# Patient Record
Sex: MALE | Race: WHITE | Employment: FULL TIME | URBAN - METROPOLITAN AREA
[De-identification: names, ages, dates, MRNs, and addresses within clinical notes are randomized per-mention and may not be internally consistent; named-entity substitution may affect disease eponyms.]

---

## 2020-01-16 ENCOUNTER — APPOINTMENT (OUTPATIENT)
Dept: GENERAL RADIOLOGY | Facility: CLINIC | Age: 55
End: 2020-01-16
Attending: EMERGENCY MEDICINE
Payer: COMMERCIAL

## 2020-01-16 ENCOUNTER — HOSPITAL ENCOUNTER (EMERGENCY)
Facility: CLINIC | Age: 55
Discharge: HOME | End: 2020-01-16
Attending: EMERGENCY MEDICINE | Admitting: EMERGENCY MEDICINE
Payer: COMMERCIAL

## 2020-01-16 VITALS
RESPIRATION RATE: 18 BRPM | SYSTOLIC BLOOD PRESSURE: 135 MMHG | OXYGEN SATURATION: 97 % | HEART RATE: 101 BPM | DIASTOLIC BLOOD PRESSURE: 78 MMHG | TEMPERATURE: 98 F | WEIGHT: 225 LBS

## 2020-01-16 DIAGNOSIS — S52.502A CLOSED FRACTURE OF DISTAL END OF LEFT RADIUS, UNSPECIFIED FRACTURE MORPHOLOGY, INITIAL ENCOUNTER: ICD-10-CM

## 2020-01-16 PROCEDURE — 73100 X-RAY EXAM OF WRIST: CPT | Mod: LT

## 2020-01-16 PROCEDURE — 25000128 H RX IP 250 OP 636: Performed by: EMERGENCY MEDICINE

## 2020-01-16 PROCEDURE — 25605 CLTX DST RDL FX/EPHYS SEP W/: CPT | Mod: LT

## 2020-01-16 PROCEDURE — 25000132 ZZH RX MED GY IP 250 OP 250 PS 637: Performed by: EMERGENCY MEDICINE

## 2020-01-16 PROCEDURE — 99285 EMERGENCY DEPT VISIT HI MDM: CPT | Mod: 25

## 2020-01-16 PROCEDURE — 40000986 XR WRIST LEFT G/E 3 VIEWS: Mod: LT

## 2020-01-16 RX ORDER — HYDROCODONE BITARTRATE AND ACETAMINOPHEN 5; 325 MG/1; MG/1
1 TABLET ORAL ONCE
Status: COMPLETED | OUTPATIENT
Start: 2020-01-16 | End: 2020-01-16

## 2020-01-16 RX ORDER — IBUPROFEN 600 MG/1
600 TABLET, FILM COATED ORAL EVERY 6 HOURS PRN
Qty: 20 TABLET | Refills: 0 | Status: SHIPPED | OUTPATIENT
Start: 2020-01-16

## 2020-01-16 RX ORDER — BUPIVACAINE HYDROCHLORIDE 5 MG/ML
10 INJECTION, SOLUTION PERINEURAL ONCE
Status: COMPLETED | OUTPATIENT
Start: 2020-01-16 | End: 2020-01-16

## 2020-01-16 RX ORDER — HYDROCODONE BITARTRATE AND ACETAMINOPHEN 5; 325 MG/1; MG/1
1-2 TABLET ORAL EVERY 4 HOURS PRN
Qty: 15 TABLET | Refills: 0 | Status: SHIPPED | OUTPATIENT
Start: 2020-01-16

## 2020-01-16 RX ADMIN — BUPIVACAINE HYDROCHLORIDE 50 MG: 5 INJECTION, SOLUTION PERINEURAL at 19:48

## 2020-01-16 RX ADMIN — HYDROCODONE BITARTRATE AND ACETAMINOPHEN 1 TABLET: 5; 325 TABLET ORAL at 19:47

## 2020-01-16 ASSESSMENT — ENCOUNTER SYMPTOMS
NUMBNESS: 0
ARTHRALGIAS: 1

## 2020-01-16 NOTE — ED AVS SNAPSHOT
[facility] Emergency Department  [address]  [facility] [address]  Phone:  [phone]  Fax:  [phone]                                    Pedro Colorado   MRN: [id number]    Department:  [facility] Emergency Department   Date of Visit:  1/16/2020           After Visit Summary Signature Page    I have received my discharge instructions, and my questions have been answered. I have discussed any challenges I see with this plan with the nurse or doctor.    ..........................................................................................................................................  Patient/Patient Representative Signature      ..........................................................................................................................................  Patient Representative Print Name and Relationship to Patient    ..................................................               ................................................  Date                                   Time    ..........................................................................................................................................  Reviewed by Signature/Title    ...................................................              ..............................................  Date                                               Time          22EPIC Rev 08/18

## 2020-01-17 NOTE — ED PROVIDER NOTES
History     Chief Complaint:  Wrist Pain    The history is provided by the patient.      Pedro Colorado is a 54 year old right hand dominant male who presents to the emergency department today for evaluation of left wrist pain. The patient states he was attempting to move his new elliptical into his home over a small stair when he pulled on one of the plastic zip ties it broke and he fell backwards. He outstretched his left hand and injured his wrist in the fall. He denies having any pain further up in the arm and has had no numbness in the extremity. No reported head trauma.     Allergies:  No Known Drug Allergies     Medications:    Albuterol inhaler  Metoprolol tartrate     Past Medical History:    Hypertension  Asthma   Hemorrhoid     Past Surgical History:    [place] teeth extraction     Family History:    Cancer- breast   Heart disease- Mother  Down Syndrome     Social History:  The patient was unaccompanied to the ED.  Smoking Status: Never Smoker  Smokeless Tobacco: Never Used  Alcohol Use: Positive, rarely      Review of Systems   Musculoskeletal: Positive for arthralgias (left wrist).   Neurological: Negative for numbness.   All other systems reviewed and are negative.      Physical Exam     Patient Vitals for the past 24 hrs:   BP Temp Temp src Pulse Heart Rate Resp SpO2 Weight   01/16/20 1947 -- -- -- -- 90 -- -- --   01/16/20 1914 135/78 98  F (36.7  C) Temporal 101 101 18 97 % 102.1 kg (225 lb)      Physical Exam   General: Resting comfortably   Head:  The scalp, face, and head appear normal  Eyes:  The pupils are normal    Conjunctivae and sclera appear normal  ENT:    The nose is normal  Neck:  Normal range of motion  Skin:  No rash or lesions noted.  Neuro:  Speech is normal and fluent  Psych: Awake. Alert.  Normal affect  MSK:  L. Wrist   Noted tenderness to palpation, slight dorsal deformity noted. Decreased flexion/extension of wrist 2/2 to pain. No reproducible L. Elbow/L. Shoulder  tenderness    The finger flexors (FDS/FDP) are intact    The finger extensors are intact    The thumb exam is normal, including:    Adduction, abduction, flexion, extension, opposition    There are no sensory deficits    Median, Ulnar, and Radial nerve function is normal    Radial artery pulsations are normal    Capillary refill is normal      Emergency Department Course     Imaging:  Radiology findings were communicated with the patient who voiced understanding of the findings.  XR, Left Wrist, 2 Views (1931)  Mildly comminuted distal radius fracture. There is displacement and dorsal tilting.  Reading per radiology      XR, Left Wrist, 2 Views (2034)  A splint has been placed. Intra-articular fracture of the distal left radius again demonstrated with mild comminution, dorsal angulation, and impaction not significantly changed.  Reading per radiology        Procedures:    Procedure Note: Hematoma Block  Indication: Distal Radius Fracture  Procedure: Skin Cleansed in the standard fashion.  Skin anesthetized with 6mL of Bupivacaine 0.5% without epinephrine.  6mL of Bupivacaine 0.5% without epinephrine was then inserted into the hematoma after placement of needle confirmed by aspiration of hematoma.  There were no complications and patient tolerated the procedure well.     Sarah Catalan DO      Procedure Note: Distal Forearm Fracture Reduction  Indication: Fracture   Procedure: After the patient's pain was adequately controlled, the injury was recreated followed by longitudinal traction, and reduction of the distal fracture fragment. An appropriate splint was then applied. There were no immediate complications, and distal sensation, perfusion, and circulation were intact after the procedure.     Sarah Catalan DO      Procedure Note - Splint Application  Procedure: A Sugar Tong Splint was applied and after placement I checked and adjusted the fit to ensure proper positioning. Patient was more comfortable  with splint in place. Sensation and circulation are intact after splint placement.    Sarah Catalan, DO      Interventions:  1947 Hydrocodone-Acetaminophen 1x 5-325 mg PO  1948 Bupivacaine 50 mg Intradermal     Emergency Department Course:  1917 Nursing notes and vitals reviewed.  1921 I performed an exam of the patient as documented above.   1931 The patient was sent for an x-ray while in the emergency department, results above.     2019 I preformed a hematoma block and fracture reduction on the patient as detailed above.   2034 The patient was sent for a repeat x-ray while in the emergency department, results above.      Findings and plan explained to the Patient. Patient discharged home with instructions regarding supportive care, medications, and reasons to return. The importance of close follow-up was reviewed. The patient was prescribed Hydrocodone-Acetaminophen and Ibuprofen    I personally reviewed the imaging results with the Patient and answered all related questions prior to discharge.      Impression & Plan      Medical Decision Making:  Patient is a 54-year-old male presenting with left wrist pain.  He is neurovascularly intact on arrival.  X-ray confirms left distal radius fracture with noted slight dorsal angulation.  I discussed reduction techniques with the patient.  He was requesting hematoma block over procedural sedation.  This was performed without complication and attempted better realignment though without significant improvements on post reduction films.  The patient was placed in a sugar tong splint as noted herein.  He remained neurovascularly intact postprocedure.  I did discuss recommendation for close orthopedic outpatient follow-up particularly given concern for intra-articular involvement.  I recommended ibuprofen/Norco for breakthrough pain.  Return to the ED for worsening pain, paresthesias or should symptoms worsen or change.  The remainder of his head to toe exam is without  trauma.        Diagnosis:    ICD-10-CM    1. Closed fracture of distal end of left radius, unspecified fracture morphology, initial encounter S52.502A        Disposition:  The patient is discharged to home.     Discharge Medications:  New Prescriptions    HYDROCODONE-ACETAMINOPHEN (NORCO) 5-325 MG TABLET    Take 1-2 tablets by mouth every 4 hours as needed for pain    IBUPROFEN (ADVIL/MOTRIN) 600 MG TABLET    Take 1 tablet (600 mg) by mouth every 6 hours as needed       Scribe Disclosure:  I, Pili Meadows, am serving as a scribe at 7:18 PM on 1/16/2020 to document services personally performed by Sarah Catalan DO based on my observations and the provider's statements to me.    1/16/2020   [facility] EMERGENCY DEPARTMENT     Sarah Catalan DO  01/16/20 1158

## 2020-11-22 ENCOUNTER — HEALTH MAINTENANCE LETTER (OUTPATIENT)
Age: 55
End: 2020-11-22

## 2021-04-01 ENCOUNTER — IMMUNIZATION (OUTPATIENT)
Dept: NURSING | Facility: CLINIC | Age: 56
End: 2021-04-01
Payer: COMMERCIAL

## 2021-04-01 PROCEDURE — 0001A PR COVID VAC PFIZER DIL RECON 30 MCG/0.3 ML IM: CPT

## 2021-04-01 PROCEDURE — 91300 PR COVID VAC PFIZER DIL RECON 30 MCG/0.3 ML IM: CPT

## 2021-04-22 ENCOUNTER — IMMUNIZATION (OUTPATIENT)
Dept: NURSING | Facility: CLINIC | Age: 56
End: 2021-04-22
Attending: INTERNAL MEDICINE
Payer: COMMERCIAL

## 2021-04-22 PROCEDURE — 91300 PR COVID VAC PFIZER DIL RECON 30 MCG/0.3 ML IM: CPT

## 2021-04-22 PROCEDURE — 0002A PR COVID VAC PFIZER DIL RECON 30 MCG/0.3 ML IM: CPT

## 2021-09-19 ENCOUNTER — HEALTH MAINTENANCE LETTER (OUTPATIENT)
Age: 56
End: 2021-09-19

## 2022-01-09 ENCOUNTER — HEALTH MAINTENANCE LETTER (OUTPATIENT)
Age: 57
End: 2022-01-09

## 2022-11-21 ENCOUNTER — HEALTH MAINTENANCE LETTER (OUTPATIENT)
Age: 57
End: 2022-11-21

## 2023-04-16 ENCOUNTER — HEALTH MAINTENANCE LETTER (OUTPATIENT)
Age: 58
End: 2023-04-16

## 2025-07-05 ENCOUNTER — HOSPITAL ENCOUNTER (EMERGENCY)
Facility: CLINIC | Age: 60
Discharge: HOME | End: 2025-07-05
Attending: PHYSICIAN ASSISTANT | Admitting: PHYSICIAN ASSISTANT
Payer: COMMERCIAL

## 2025-07-05 VITALS
OXYGEN SATURATION: 96 % | WEIGHT: 233.25 LBS | HEART RATE: 81 BPM | RESPIRATION RATE: 16 BRPM | BODY MASS INDEX: 32.65 KG/M2 | TEMPERATURE: 97.7 F | DIASTOLIC BLOOD PRESSURE: 80 MMHG | SYSTOLIC BLOOD PRESSURE: 137 MMHG | HEIGHT: 71 IN

## 2025-07-05 DIAGNOSIS — S05.01XA ABRASION OF RIGHT CORNEA, INITIAL ENCOUNTER: ICD-10-CM

## 2025-07-05 PROCEDURE — 99283 EMERGENCY DEPT VISIT LOW MDM: CPT

## 2025-07-05 RX ORDER — TETRACAINE HYDROCHLORIDE 5 MG/ML
2 SOLUTION OPHTHALMIC ONCE
Status: DISCONTINUED | OUTPATIENT
Start: 2025-07-05 | End: 2025-07-05 | Stop reason: HOSPADM

## 2025-07-05 RX ORDER — CIPROFLOXACIN HYDROCHLORIDE 3.5 MG/ML
2 SOLUTION/ DROPS TOPICAL 4 TIMES DAILY
Qty: 10 ML | Refills: 0 | Status: SHIPPED | OUTPATIENT
Start: 2025-07-05 | End: 2025-07-10

## 2025-07-05 ASSESSMENT — VISUAL ACUITY
OS: 20/80;WITHOUT CORRECTIVE LENSES
OU: 20/20;WITHOUT CORRECTIVE LENSES
OD: 20/20;WITHOUT CORRECTIVE LENSES

## 2025-07-05 ASSESSMENT — COLUMBIA-SUICIDE SEVERITY RATING SCALE - C-SSRS
2. HAVE YOU ACTUALLY HAD ANY THOUGHTS OF KILLING YOURSELF IN THE PAST MONTH?: NO
6. HAVE YOU EVER DONE ANYTHING, STARTED TO DO ANYTHING, OR PREPARED TO DO ANYTHING TO END YOUR LIFE?: NO
1. IN THE PAST MONTH, HAVE YOU WISHED YOU WERE DEAD OR WISHED YOU COULD GO TO SLEEP AND NOT WAKE UP?: NO

## 2025-07-05 ASSESSMENT — ACTIVITIES OF DAILY LIVING (ADL)
ADLS_ACUITY_SCORE: 41
ADLS_ACUITY_SCORE: 41

## 2025-07-05 NOTE — ED PROVIDER NOTES
"  Emergency Department Note      History of Present Illness     Chief Complaint   Eye Problem    HPI   Pedro Colorado is a 60 year old male with a history of hypertension, hyperlipidemia, PVD, and presbyopia, who presents to the emergency department today for evaluation of an eye problem. The patient reports he was painting yesterday while wearing safety glasses, when he felt like he got sweat got into his right eye, causing a foreign body sensation, which caused his vision to become foggy. Pedro then reports he rubbed his eye repeatedly, and attempted to wash out his eye. This morning when he woke up, Pedro reports his vision was foggier than yesterday, and that his eye had produced discharge. He notes pain associated with rubbing his eye. Pedro denies wearing contacts.     Independent Historian   None    Review of External Notes   None    Past Medical History     Medical History and Problem List   Essential hypertension  Mixed hyperlipidemia  Mild persistent asthma without complication  Chronic rhinitis  IFG  Vitreous floater, right  PVD, right  Presbyopia  Hemorrhoids, external  GERD without esophagitis  Obesity     Medications   Albuterol sulfate  Atorvastatin  Fluticasone-salmeterol  Losartan  Hydrocortisone  Metoprolol tartrate    Surgical History   Wrist fracture reduction (left)  Dental implant  Colonoscopy     Physical Exam     Patient Vitals for the past 24 hrs:   BP Temp Temp src Pulse Resp SpO2 Height Weight   07/05/25 0926 (!) 151/88 97.7  F (36.5  C) Temporal 93 16 100 % 1.803 m (5' 11\") 105.8 kg (233 lb 4 oz)     Physical Exam  Constitutional: Pleasant. Cooperative. Non-toxic appearing.  HEENT: Pupils equally round and reactive. Trace medial conjunctival injection to right eye, especially to lateral aspect. No discharge. Lids everted, no foreign bodies seen. No lid edema. Fluorescein exam reveals scattered punctate uptake overlying cornea. Negative Griselda sign. VA 20/20 in left eye, 20/80 in right " eye. Right eye ph 7.5  Respiratory: Normal respiratory effort, lungs are clear bilaterally.  Skin: Normal, without rash. No periorbital erythema.  Neurologic: Cranial nerves grossly intact. Alert.  Nursing notes and vital signs reviewed.       Diagnostics     Lab Results   Labs Ordered and Resulted from Time of ED Arrival to Time of ED Departure - No data to display    Imaging   No orders to display     Independent Interpretation   None    ED Course      Medications Administered   Medications   fluorescein (FUL-DAKSHA) ophthalmic strip 1 strip (has no administration in time range)   tetracaine (PONTOCAINE) 0.5 % ophthalmic solution 2 drop (has no administration in time range)       Procedures   Procedures     Discussion of Management   None    ED Course   ED Course as of 07/05/25 1105   Sat Jul 05, 2025   1009 I obtained history and examined the patient as noted above.       Additional Documentation  None    Medical Decision Making / Diagnosis     CMS Diagnoses: None    MIPS   None    [facility]   Pedro Colorado is a 60 year old male who presents to the ED as an eye concern.  Patient notes cloudy vision to the right eye since yesterday. No severe pain. See HPI as above for additional details. Vitals and physical exam as above. DDx was broad and included corneal abrasion, chemical conjunctivitis, FB, iritis, ulcer, keratitis, amongst others. Uptake to corneal consistent with corneal defect. No obvious FB. Exact etiology is unclear at this time. pH of eye is normal. Will start patient on abx and have close outpatient follow up with ophtho with persistent symptoms. Lower suspicion for remainder of ddx at this time. Discussed reasons to return. All questions answered. Patient discharged to home in stable condition.    Disposition   The patient was discharged.     Diagnosis     ICD-10-CM    1. Abrasion of right cornea, initial encounter  S05.01XA Adult Eye  Referral         Discharge Medications   New Prescriptions     CIPROFLOXACIN (CILOXAN) 0.3 % OPHTHALMIC SOLUTION    Place 2 drops into the right eye 4 times daily for 5 days.     Scribe Disclosure:  I, Quinn Rico, am serving as a scribe at 9:48 AM on 7/5/2025 to document services personally performed by Farrukh Badillo PA-C based on my observations and the provider's statements to me.     This record was created at least in part using electronic voice recognition software, so please excuse any typographical errors.       Farrukh Badillo PA-C  07/05/25 1114

## 2025-07-05 NOTE — ED TRIAGE NOTES
Patient reports painting yesterday and got something in right eye eye.  Reports he does not think it was paint.  Reports foggy vision and irritation this morning.  ABCs intact, A&Ox4     Triage Assessment (Adult)       Row Name 07/05/25 0943          Triage Assessment    Airway WDL WDL        Respiratory WDL    Respiratory WDL WDL        Skin Circulation/Temperature WDL    Skin Circulation/Temperature WDL WDL        Cardiac WDL    Cardiac WDL WDL        Peripheral/Neurovascular WDL    Peripheral Neurovascular WDL WDL        Cognitive/Neuro/Behavioral WDL    Cognitive/Neuro/Behavioral WDL WDL

## 2025-07-08 ENCOUNTER — TELEPHONE (OUTPATIENT)
Dept: OPHTHALMOLOGY | Facility: CLINIC | Age: 60
End: 2025-07-08
Payer: COMMERCIAL

## 2025-07-08 NOTE — TELEPHONE ENCOUNTER
Called and LVM    Abrasion of right cornea, initial encounter     Ok to schedule in a new adult eye with Alvaro Bradshaw / Dr. Navas/ Dr. Mtz / Dr. Clinton Altamirano Communication Facilitator on 7/8/2025 at 1:21 PM

## 2025-07-08 NOTE — TELEPHONE ENCOUNTER
Patient notes that continues eyedrops as prescribed at the ED.  He notes his eye is feeling better but that he still has a slight feeling of FB in his eye.  The patient will reach out to his eye doctor.  If not able to get in to his eye doctor he will call us tomorrow.